# Patient Record
(demographics unavailable — no encounter records)

---

## 2025-05-29 NOTE — HISTORY OF PRESENT ILLNESS
[FreeTextEntry1] : Latricia is 21 y/o G0 and presents for an annual well woman gyn visit. Reports she is doing well. LMP was approx on 05/01/25. States monthly regular periods with current contraceptive Annemarie IUD. She is sexually active, and notes is happy with current contraceptive method. Is not using condoms. Denies GYN complaints. She will be starting at [employer] this fall, did a semester at [employer] last fall and did not like it. 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

## 2025-05-29 NOTE — PHYSICAL EXAM
[Appropriately responsive] : appropriately responsive [Alert] : alert [No Acute Distress] : no acute distress [Soft] : soft [Non-tender] : non-tender [Non-distended] : non-distended [No HSM] : No HSM [No Lesions] : no lesions [No Mass] : no mass [Oriented x3] : oriented x3 [Examination Of The Breasts] : a normal appearance [No Discharge] : no discharge [No Masses] : no breast masses were palpable [Labia Minora] : normal [Labia Majora] : normal [Discharge] : a  ~M vaginal discharge was present [Scant] : scant [White] : white [Thin] : thin [No Bleeding] : There was no active vaginal bleeding [IUD String] : an IUD string was noted [Normal] : normal [Foul Smelling] : not foul smelling [FreeTextEntry4] : Aptima obtained.

## 2025-05-29 NOTE — DISCUSSION/SUMMARY
[FreeTextEntry1] : 21 y/o GYN annual -aptima obtained. -SBE reviewed -reviewed IUD strings noted today. -reviewed cervical pap smears are recommended to start at the age of 21, and next yr will be her first pap. Pt verbalized understanding.   RTO for gyn annual or sooner if needed. Instructed to call with questions or concerns.